# Patient Record
Sex: FEMALE | Race: WHITE | NOT HISPANIC OR LATINO | Employment: STUDENT | URBAN - METROPOLITAN AREA
[De-identification: names, ages, dates, MRNs, and addresses within clinical notes are randomized per-mention and may not be internally consistent; named-entity substitution may affect disease eponyms.]

---

## 2022-11-22 ENCOUNTER — OFFICE VISIT (OUTPATIENT)
Dept: FAMILY MEDICINE CLINIC | Facility: CLINIC | Age: 22
End: 2022-11-22

## 2022-11-22 VITALS
RESPIRATION RATE: 16 BRPM | TEMPERATURE: 97.5 F | BODY MASS INDEX: 27.14 KG/M2 | HEART RATE: 96 BPM | OXYGEN SATURATION: 99 % | HEIGHT: 64 IN | WEIGHT: 159 LBS

## 2022-11-22 DIAGNOSIS — Z00.00 ANNUAL PHYSICAL EXAM: Primary | ICD-10-CM

## 2022-11-22 DIAGNOSIS — F90.0 ATTENTION DEFICIT HYPERACTIVITY DISORDER (ADHD), PREDOMINANTLY INATTENTIVE TYPE: ICD-10-CM

## 2022-11-22 RX ORDER — ATOMOXETINE 18 MG/1
18 CAPSULE ORAL DAILY
Qty: 30 CAPSULE | Refills: 0 | Status: SHIPPED | OUTPATIENT
Start: 2022-11-22

## 2022-11-22 NOTE — PROGRESS NOTES
ADULT ANNUAL PHYSICAL  [address] [facility]    NAME: Danelle Unger  AGE: 25 y o  SEX: female  : 2000     DATE: 2022     Assessment and Plan:   Nelia Pal was seen today for establish care and well check  Diagnoses and all orders for this visit:    Annual physical exam    Attention deficit hyperactivity disorder (ADHD), predominantly inattentive type  -     atoMOXetine (STRATTERA) 18 mg capsule; Take 1 capsule (18 mg total) by mouth daily      Problem List Items Addressed This Visit    None  Visit Diagnoses     Annual physical exam    -  Primary    Attention deficit hyperactivity disorder (ADHD), predominantly inattentive type        Relevant Medications    atoMOXetine (STRATTERA) 18 mg capsule          Immunizations and preventive care screenings were discussed with patient today  Appropriate education was printed on patient's after visit summary  Counseling:  Alcohol/drug use: discussed moderation in alcohol intake, the recommendations for healthy alcohol use  Dental Health: discussed importance of regular tooth brushing, flossing, and dental visits  Injury prevention: discussed safety/seat belts, safety helmets, smoke detectors, carbon dioxide detectors  Sexual health: discussed sexually transmitted diseases, partner selection, use of condoms, avoidance of unintended pregnancy, and contraceptive alternatives  · Exercise: the importance of regular exercise/physical activity was discussed  Recommend exercise 3-5 times per week for at least 30 minutes  BMI Counseling: Body mass index is 27 48 kg/m²   The BMI is above normal  Nutrition recommendations include decreasing portion sizes, encouraging healthy choices of fruits and vegetables, decreasing fast food intake, consuming healthier snacks, limiting drinks that contain sugar, moderation in carbohydrate intake, increasing intake of lean protein, reducing intake of saturated and trans fat and reducing intake of cholesterol  Exercise recommendations include exercising 3-5 times per week  Patient referred to PCP  Rationale for BMI follow-up plan is due to patient being overweight or obese  Depression Screening and Follow-up Plan: Patient was screened for depression during today's encounter  They screened negative with a PHQ-2 score of 0  Chief Complaint:     Chief Complaint   Patient presents with   • Establish Care     Pt did not bring records w/ her    • Well Check      History of Present Illness:     Adult Annual Physical   Patient here for a comprehensive physical exam   Patient is changing over from pediatrician  Mom is a patient in his practice  The patient reports problems - joint issues right shoulder, knee issues  Scoliosis   Played ball in HS/Grade school  Saw orthopedics     Diet and Physical Activity  · Diet/Nutrition: well balanced diet  · Exercise: no formal exercise  Depression Screening  PHQ-2/9 Depression Screening    Little interest or pleasure in doing things: 0 - not at all  Feeling down, depressed, or hopeless: 0 - not at all  PHQ-2 Score: 0  PHQ-2 Interpretation: Negative depression screen       General Health  · Sleep: sleeps well  · Hearing: normal - bilateral   · Vision: most recent eye exam <1 year ago and wears contacts  · Dental: regular dental visits  /GYN Health  · Last menstrual period: now is regular off of OC since June  · Contraceptive method: NOT active  · History of STDs?: no      Review of Systems:     Review of Systems   HENT: Postnasal drip: some  Gastrointestinal: Negative  Genitourinary: Negative  Psychiatric/Behavioral:        Focusing issues, "informally" diagnosed with ADHD   Did OK with HS grades-- college a bit more, Has test anxiety         Past Medical History:     History reviewed  No pertinent past medical history     Past Surgical History:     Past Surgical History:   Procedure Laterality Date   • WISDOM TOOTH EXTRACTION Bilateral       Social History:     Social History     Socioeconomic History   • Marital status: Unknown     Spouse name: None   • Number of children: None   • Years of education: None   • Highest education level: None   Occupational History   • None   Tobacco Use   • Smoking status: Never   • Smokeless tobacco: Never   Vaping Use   • Vaping Use: Never used   Substance and Sexual Activity   • Alcohol use: Yes   • Drug use: Never   • Sexual activity: Not Currently     Partners: Male   Other Topics Concern   • None   Social History Narrative    Pre-Vet   [employer]     Social Determinants of Health     Financial Resource Strain: Not on file   Food Insecurity: Not on file   Transportation Needs: Not on file   Physical Activity: Not on file   Stress: Not on file   Social Connections: Not on file   Intimate Partner Violence: Not on file   Housing Stability: Not on file      Family History:     Family History   Problem Relation Age of Onset   • Asthma Mother    • Diabetes Maternal Grandmother    • Lung cancer Maternal Grandfather    • Cancer Maternal Grandfather    • Parkinsonism Paternal Grandfather       Current Medications:     Current Outpatient Medications   Medication Sig Dispense Refill   • atoMOXetine (STRATTERA) 18 mg capsule Take 1 capsule (18 mg total) by mouth daily 30 capsule 0     No current facility-administered medications for this visit  Allergies:     No Known Allergies   Physical Exam:     Pulse 96   Temp 97 5 °F (36 4 °C) (Temporal)   Resp 16   Ht 5' 3 78" (1 62 m)   Wt 72 1 kg (159 lb)   SpO2 99%   BMI 27 48 kg/m²     Physical Exam  Vitals and nursing note reviewed  Constitutional:       General: She is not in acute distress  Appearance: Normal appearance  She is well-developed and normal weight  HENT:      Head: Normocephalic and atraumatic  Nose: Nose normal       Mouth/Throat:      Mouth: Mucous membranes are dry     Eyes: Conjunctiva/sclera: Conjunctivae normal    Neck:      Vascular: No carotid bruit  Cardiovascular:      Rate and Rhythm: Normal rate and regular rhythm  Heart sounds: No murmur heard  Pulmonary:      Effort: Pulmonary effort is normal  No respiratory distress  Breath sounds: Normal breath sounds  Abdominal:      General: Bowel sounds are normal       Palpations: Abdomen is soft  Tenderness: There is no abdominal tenderness  Musculoskeletal:         General: No swelling  Normal range of motion  Skin:     General: Skin is warm and dry  Capillary Refill: Capillary refill takes less than 2 seconds  Neurological:      Mental Status: She is alert and oriented to person, place, and time  Psychiatric:         Mood and Affect: Mood normal          Behavior: Behavior normal          Thought Content:  Thought content normal          Judgment: Judgment normal           Siddharth Miguel DO   [facility]

## 2022-11-22 NOTE — PATIENT INSTRUCTIONS

## 2022-12-14 DIAGNOSIS — F90.0 ATTENTION DEFICIT HYPERACTIVITY DISORDER (ADHD), PREDOMINANTLY INATTENTIVE TYPE: ICD-10-CM

## 2022-12-14 RX ORDER — ATOMOXETINE 18 MG/1
CAPSULE ORAL
Qty: 90 CAPSULE | Refills: 1 | Status: SHIPPED | OUTPATIENT
Start: 2022-12-14

## 2023-02-01 DIAGNOSIS — F90.0 ATTENTION DEFICIT HYPERACTIVITY DISORDER (ADHD), PREDOMINANTLY INATTENTIVE TYPE: ICD-10-CM

## 2023-02-01 RX ORDER — ATOMOXETINE 25 MG/1
25 CAPSULE ORAL DAILY
Qty: 30 CAPSULE | Refills: 0 | Status: SHIPPED | OUTPATIENT
Start: 2023-02-01 | End: 2023-02-24

## 2023-02-23 DIAGNOSIS — F90.0 ATTENTION DEFICIT HYPERACTIVITY DISORDER (ADHD), PREDOMINANTLY INATTENTIVE TYPE: ICD-10-CM

## 2023-02-24 RX ORDER — ATOMOXETINE 25 MG/1
CAPSULE ORAL
Qty: 90 CAPSULE | Refills: 1 | Status: SHIPPED | OUTPATIENT
Start: 2023-02-24

## 2023-03-20 DIAGNOSIS — F90.0 ATTENTION DEFICIT HYPERACTIVITY DISORDER (ADHD), PREDOMINANTLY INATTENTIVE TYPE: ICD-10-CM

## 2023-03-20 RX ORDER — ATOMOXETINE 40 MG/1
40 CAPSULE ORAL DAILY
Qty: 30 CAPSULE | Refills: 0 | Status: SHIPPED | OUTPATIENT
Start: 2023-03-20 | End: 2023-04-17

## 2023-03-27 ENCOUNTER — OFFICE VISIT (OUTPATIENT)
Dept: FAMILY MEDICINE CLINIC | Facility: CLINIC | Age: 23
End: 2023-03-27

## 2023-03-27 VITALS
RESPIRATION RATE: 16 BRPM | DIASTOLIC BLOOD PRESSURE: 68 MMHG | HEART RATE: 80 BPM | OXYGEN SATURATION: 99 % | WEIGHT: 156.6 LBS | HEIGHT: 64 IN | TEMPERATURE: 97 F | BODY MASS INDEX: 26.73 KG/M2 | SYSTOLIC BLOOD PRESSURE: 112 MMHG

## 2023-03-27 DIAGNOSIS — L30.9 FACIAL DERMATITIS: Primary | ICD-10-CM

## 2023-03-27 RX ORDER — PIMECROLIMUS 10 MG/G
CREAM TOPICAL 2 TIMES DAILY
Qty: 30 G | Refills: 0 | Status: SHIPPED | OUTPATIENT
Start: 2023-03-27 | End: 2023-03-27

## 2023-03-27 RX ORDER — PREDNISONE 10 MG/1
TABLET ORAL
Qty: 21 EACH | Refills: 0 | Status: SHIPPED | OUTPATIENT
Start: 2023-03-27

## 2023-03-27 NOTE — PROGRESS NOTES
Name: Aron Huerta      : 2000      MRN: [id number]  Encounter Provider: Yamilex Layton DO  Encounter Date: 3/27/2023   Encounter department: [address] [place] [address] Road     1  Facial dermatitis  -     Crisaborole 2 % OINT; Apply topically 2 (two) times a day  -     predniSONE 10 MG (21) TBPK; As directed take 6-5-4-3-2-1    Patient to continue to consider checking for triggers  Prednisone taper patient will use topical hydrocortisone on any rash below the neck  She can mix this with moisturizer  She will use only moisturizing soaps  She will use everything nonscented and hypoallergenic  Depression Screening and Follow-up Plan: Patient was screened for depression during today's encounter  They screened negative with a PHQ-2 score of 0  Subjective     58-year-old female here for evaluation of rash on the face  As noted, she had history of perioral dermatitis in her teens  Rash  This is a new problem  The current episode started 1 to 4 weeks ago  The problem has been gradually worsening since onset  The affected locations include the face and chest  The problem is mild  The rash is characterized by dryness  Her past medical history is significant for eczema (arleth-oral dermatitis history)  Chief Complaint   Patient presents with   • Skin Problem     Pt is here for skin irritation that has spread all over her body  Pt noticed it about a month ago      Review of Systems   Skin: Positive for rash  History reviewed  No pertinent past medical history    Past Surgical History:   Procedure Laterality Date   • WISDOM TOOTH EXTRACTION Bilateral      Family History   Problem Relation Age of Onset   • Asthma Mother    • Diabetes Maternal Grandmother    • Lung cancer Maternal Grandfather    • Cancer Maternal Grandfather    • Parkinsonism Paternal Grandfather      Social History     Socioeconomic History   • Marital status: Unknown     Spouse name: None   • "Number of children: None   • Years of education: None   • Highest education level: None   Occupational History   • None   Tobacco Use   • Smoking status: Never   • Smokeless tobacco: Never   Vaping Use   • Vaping Use: Never used   Substance and Sexual Activity   • Alcohol use: Yes   • Drug use: Never   • Sexual activity: Not Currently     Partners: Male   Other Topics Concern   • None   Social History Narrative    Pre-Vet   [employer]     Social Determinants of Health     Financial Resource Strain: Not on file   Food Insecurity: Not on file   Transportation Needs: Not on file   Physical Activity: Not on file   Stress: Not on file   Social Connections: Not on file   Intimate Partner Violence: Not on file   Housing Stability: Not on file     Current Outpatient Medications on File Prior to Visit   Medication Sig   • atoMOXetine (STRATTERA) 40 mg capsule Take 1 capsule (40 mg total) by mouth daily   • Norethin-Eth Estrad-Fe Biphas (LO LOESTRIN FE PO)      No Known Allergies  Immunization History   Administered Date(s) Administered   • COVID-19 PFIZER VACCINE 0 3 ML IM 05/20/2021, 06/10/2021, 12/23/2021   • INFLUENZA 11/03/2017       Objective     /68   Pulse 80   Temp (!) 97 °F (36 1 °C) (Temporal)   Resp 16   Ht 5' 4\" (1 626 m)   Wt 71 kg (156 lb 9 6 oz)   SpO2 99%   BMI 26 88 kg/m²     Physical Exam  Constitutional:       Appearance: Normal appearance  Cardiovascular:      Rate and Rhythm: Normal rate and regular rhythm  Pulmonary:      Effort: Pulmonary effort is normal       Breath sounds: Normal breath sounds  Lymphadenopathy:      Cervical: No cervical adenopathy  Skin:     General: Skin is warm  Comments: Generalized dryness of face with few dominant papules then generalized scaliness and erythema (macular papular)   Neurological:      Mental Status: She is alert and oriented to person, place, and time     Psychiatric:         Mood and Affect: Mood normal          Media " Information  Document Information    Clinical Image - Mobile Device   face   03/27/2023 12:08 PM   Attached To:    Office Visit on 3/27/23 with Christopher Stevens DO     Source Information    Christopher Stevens DO  [facility]         Christopher Stevens DO

## 2024-01-23 ENCOUNTER — TELEPHONE (OUTPATIENT)
Dept: OTHER | Facility: OTHER | Age: 24
End: 2024-01-23

## 2024-01-23 NOTE — TELEPHONE ENCOUNTER
Patient is calling office to schedule a sick appointment for tomorrow . Please f/u to schedule . Patient was tested positive for Covid and has question and concerns

## 2024-01-23 NOTE — TELEPHONE ENCOUNTER
Patient was called and made aware that there was no openings for tomorrow. I did offer Thursday for different providers. Patient said her symptoms are very mild and is wondering if she needs to be put on paxlovid and if she could get a school note.     Please advise

## 2024-01-24 DIAGNOSIS — U07.1 COVID: Primary | ICD-10-CM

## 2024-01-24 DIAGNOSIS — L30.9 FACIAL DERMATITIS: ICD-10-CM

## 2024-01-24 RX ORDER — PREDNISONE 10 MG/1
TABLET ORAL
Qty: 21 EACH | Refills: 0 | Status: SHIPPED | OUTPATIENT
Start: 2024-01-24

## 2024-01-24 NOTE — TELEPHONE ENCOUNTER
I called and spoke with the patient and she verbalized agreement. Prednisone taper pended. Patient is also needs a school note she her first day missed was Tuesday night.

## 2024-01-24 NOTE — TELEPHONE ENCOUNTER
I called and spoke with the patient and she started on Monday with congestion and runny nose during the day and  in the evenings she has been has been feeling very exhausted and lethargic. She is open to Paxlovid if you feel it is needed.

## 2025-07-01 ENCOUNTER — TELEPHONE (OUTPATIENT)
Dept: FAMILY MEDICINE CLINIC | Facility: CLINIC | Age: 25
End: 2025-07-01